# Patient Record
Sex: FEMALE | Race: WHITE | NOT HISPANIC OR LATINO | Employment: FULL TIME | URBAN - NONMETROPOLITAN AREA
[De-identification: names, ages, dates, MRNs, and addresses within clinical notes are randomized per-mention and may not be internally consistent; named-entity substitution may affect disease eponyms.]

---

## 2023-05-22 DIAGNOSIS — J01.90 ACUTE NON-RECURRENT SINUSITIS, UNSPECIFIED LOCATION: Primary | ICD-10-CM

## 2023-05-22 RX ORDER — AMOXICILLIN AND CLAVULANATE POTASSIUM 875; 125 MG/1; MG/1
875 TABLET, FILM COATED ORAL 2 TIMES DAILY
Qty: 20 TABLET | Refills: 0 | Status: SHIPPED | OUTPATIENT
Start: 2023-05-22 | End: 2023-06-01

## 2023-12-04 ENCOUNTER — OFFICE VISIT (OUTPATIENT)
Dept: PRIMARY CARE | Facility: CLINIC | Age: 48
End: 2023-12-04
Payer: COMMERCIAL

## 2023-12-04 ENCOUNTER — LAB (OUTPATIENT)
Dept: LAB | Facility: LAB | Age: 48
End: 2023-12-04
Payer: COMMERCIAL

## 2023-12-04 VITALS
WEIGHT: 176.6 LBS | HEIGHT: 66 IN | TEMPERATURE: 97 F | SYSTOLIC BLOOD PRESSURE: 135 MMHG | OXYGEN SATURATION: 99 % | DIASTOLIC BLOOD PRESSURE: 96 MMHG | HEART RATE: 74 BPM | BODY MASS INDEX: 28.38 KG/M2

## 2023-12-04 DIAGNOSIS — E55.9 VITAMIN D INSUFFICIENCY: ICD-10-CM

## 2023-12-04 DIAGNOSIS — N95.1 PERIMENOPAUSE: ICD-10-CM

## 2023-12-04 DIAGNOSIS — E78.5 BORDERLINE HYPERLIPIDEMIA: Primary | ICD-10-CM

## 2023-12-04 DIAGNOSIS — E78.5 BORDERLINE HYPERLIPIDEMIA: ICD-10-CM

## 2023-12-04 LAB
25(OH)D3 SERPL-MCNC: 53 NG/ML (ref 30–100)
ALBUMIN SERPL BCP-MCNC: 4.5 G/DL (ref 3.4–5)
ALP SERPL-CCNC: 54 U/L (ref 33–110)
ALT SERPL W P-5'-P-CCNC: 12 U/L (ref 7–45)
ANION GAP SERPL CALC-SCNC: 12 MMOL/L (ref 10–20)
AST SERPL W P-5'-P-CCNC: 17 U/L (ref 9–39)
BILIRUB SERPL-MCNC: 0.5 MG/DL (ref 0–1.2)
BUN SERPL-MCNC: 10 MG/DL (ref 6–23)
CALCIUM SERPL-MCNC: 9.5 MG/DL (ref 8.6–10.3)
CHLORIDE SERPL-SCNC: 104 MMOL/L (ref 98–107)
CHOLEST SERPL-MCNC: 167 MG/DL (ref 0–199)
CHOLESTEROL/HDL RATIO: 2.4
CO2 SERPL-SCNC: 27 MMOL/L (ref 21–32)
CREAT SERPL-MCNC: 0.78 MG/DL (ref 0.5–1.05)
ERYTHROCYTE [DISTWIDTH] IN BLOOD BY AUTOMATED COUNT: 14.4 % (ref 11.5–14.5)
FSH SERPL-ACNC: 96.2 IU/L
GFR SERPL CREATININE-BSD FRML MDRD: >90 ML/MIN/1.73M*2
GLUCOSE SERPL-MCNC: 86 MG/DL (ref 74–99)
HCT VFR BLD AUTO: 34.9 % (ref 36–46)
HDLC SERPL-MCNC: 71 MG/DL
HGB BLD-MCNC: 10.5 G/DL (ref 12–16)
LDLC SERPL CALC-MCNC: 82 MG/DL
LH SERPL-ACNC: 37.5 IU/L
MCH RBC QN AUTO: 26.4 PG (ref 26–34)
MCHC RBC AUTO-ENTMCNC: 30.1 G/DL (ref 32–36)
MCV RBC AUTO: 88 FL (ref 80–100)
NON HDL CHOLESTEROL: 96 MG/DL (ref 0–149)
NRBC BLD-RTO: 0 /100 WBCS (ref 0–0)
PLATELET # BLD AUTO: 367 X10*3/UL (ref 150–450)
POTASSIUM SERPL-SCNC: 4.1 MMOL/L (ref 3.5–5.3)
PROT SERPL-MCNC: 7.3 G/DL (ref 6.4–8.2)
RBC # BLD AUTO: 3.98 X10*6/UL (ref 4–5.2)
SODIUM SERPL-SCNC: 139 MMOL/L (ref 136–145)
TRIGL SERPL-MCNC: 70 MG/DL (ref 0–149)
TSH SERPL-ACNC: 0.85 MIU/L (ref 0.44–3.98)
VLDL: 14 MG/DL (ref 0–40)
WBC # BLD AUTO: 6.8 X10*3/UL (ref 4.4–11.3)

## 2023-12-04 PROCEDURE — 83002 ASSAY OF GONADOTROPIN (LH): CPT

## 2023-12-04 PROCEDURE — 83001 ASSAY OF GONADOTROPIN (FSH): CPT

## 2023-12-04 PROCEDURE — 1036F TOBACCO NON-USER: CPT | Performed by: PHYSICIAN ASSISTANT

## 2023-12-04 PROCEDURE — 36415 COLL VENOUS BLD VENIPUNCTURE: CPT

## 2023-12-04 PROCEDURE — 82306 VITAMIN D 25 HYDROXY: CPT

## 2023-12-04 PROCEDURE — 99214 OFFICE O/P EST MOD 30 MIN: CPT | Performed by: PHYSICIAN ASSISTANT

## 2023-12-04 NOTE — PROGRESS NOTES
"Subjective     HPI   Hayley Brumfield is a 48 y.o. year old female patient with presenting to clinic with concern for   Chief Complaint   Patient presents with    Annual Exam    New Patient Visit       Left shoulder clicking and discomfort.  Bilat thumb pain with ROM R>L     Weight concerns- exercising regularly- running heavily in the morning, cutting carbs nearly entirely to prevent weight.    Need to recheck BP- first visit here. Going to GYN tomorrow. Will recheck    There is no problem list on file for this patient.      Past Medical History:   Diagnosis Date    Encounter for full-term uncomplicated delivery      (spontaneous vaginal delivery)    Varicella without complication     Varicella without complication      Past Surgical History:   Procedure Laterality Date    BI STEREOTACTIC GUIDED BREAST RIGHT LOCALIZATION AND BIOPSY Right 2015    BI STEREOTACTIC GUIDED BREAST LOCALIZATION AND BIOPSY RIGHT [facility]    MOUTH SURGERY  2018    Oral Surgery Tooth Extraction      No family history on file.   Social History     Tobacco Use    Smoking status: Former     Types: Cigarettes    Smokeless tobacco: Never   Substance Use Topics    Alcohol use: Not Currently      No current outpatient medications on file.     Review of Systems  Constitutional: Denies fever  HEENT: Denies ST, earache  CVS: Denies Chest pain  Pulmonary: Denies wheezing, SOB  GI: Denies N/V  : Denies dysuria  Musculoskeletal:  Denies myalgia  Neuro: Denies focal weakness or numbness.  Skin: Denies Rashes.  *Review of Systems is negative unless otherwise mentioned in HPI or ROS above.    Objective   BP (!) 135/96   Pulse 74   Temp 36.1 °C (97 °F)   Ht 1.676 m (5' 6\")   Wt 80.1 kg (176 lb 9.6 oz)   SpO2 99%   BMI 28.50 kg/m²  reviewed Body mass index is 28.5 kg/m².     Physical Exam  Constitutional: NAD.  Resting comfortably.  Head: Atraumatic, normocephalic.  ENT: Moist oral mucosa. Nasal mucosa wnl.   Cardiac: Regular rate & " rhythm.   Pulmonary: Lungs clear bilat  GI: Soft, Nontender, nondistended.   Musculoskeletal: No peripheral edema.   Skin: No evidence of trauma. No rashes  Psych: Intact judgement and insight.    .Assessment/Plan

## 2023-12-05 ENCOUNTER — OFFICE VISIT (OUTPATIENT)
Dept: OBSTETRICS AND GYNECOLOGY | Facility: CLINIC | Age: 48
End: 2023-12-05
Payer: COMMERCIAL

## 2023-12-05 VITALS
WEIGHT: 176 LBS | HEIGHT: 66 IN | BODY MASS INDEX: 28.28 KG/M2 | SYSTOLIC BLOOD PRESSURE: 104 MMHG | DIASTOLIC BLOOD PRESSURE: 60 MMHG

## 2023-12-05 DIAGNOSIS — Z12.39 BREAST CANCER SCREENING, HIGH RISK PATIENT: ICD-10-CM

## 2023-12-05 DIAGNOSIS — Z12.4 CERVICAL CANCER SCREENING: ICD-10-CM

## 2023-12-05 DIAGNOSIS — N92.0 MENORRHAGIA WITH REGULAR CYCLE: ICD-10-CM

## 2023-12-05 DIAGNOSIS — Z01.419 WELL WOMAN EXAM WITH ROUTINE GYNECOLOGICAL EXAM: Primary | ICD-10-CM

## 2023-12-05 DIAGNOSIS — D64.9 ANEMIA, UNSPECIFIED TYPE: Primary | ICD-10-CM

## 2023-12-05 PROBLEM — F10.20 ALCOHOL DEPENDENCE (MULTI): Status: ACTIVE | Noted: 2023-12-05

## 2023-12-05 PROBLEM — R21 SKIN RASH: Status: ACTIVE | Noted: 2023-12-05

## 2023-12-05 PROBLEM — E66.3 OVERWEIGHT: Status: ACTIVE | Noted: 2023-12-05
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
## 2023-12-05 PROBLEM — N94.3 PMS (PREMENSTRUAL SYNDROME): Status: ACTIVE | Noted: 2023-12-05

## 2023-12-05 PROBLEM — K62.5 BLEEDING PER RECTUM: Status: ACTIVE | Noted: 2023-12-05

## 2023-12-05 PROBLEM — F17.200 SMOKER: Status: ACTIVE | Noted: 2023-12-05

## 2023-12-05 PROBLEM — K76.89 LIVER CYST: Status: ACTIVE | Noted: 2023-12-05

## 2023-12-05 PROBLEM — L50.9 URTICARIA: Status: ACTIVE | Noted: 2023-12-05

## 2023-12-05 PROBLEM — N63.0 BREAST MASS IN FEMALE: Status: ACTIVE | Noted: 2023-12-05

## 2023-12-05 PROBLEM — R92.8 ABNORMAL MRI, BREAST: Status: RESOLVED | Noted: 2023-12-05 | Resolved: 2023-12-05

## 2023-12-05 PROCEDURE — 87624 HPV HI-RISK TYP POOLED RSLT: CPT

## 2023-12-05 PROCEDURE — 99396 PREV VISIT EST AGE 40-64: CPT | Performed by: NURSE PRACTITIONER

## 2023-12-05 PROCEDURE — 1036F TOBACCO NON-USER: CPT | Performed by: NURSE PRACTITIONER

## 2023-12-05 PROCEDURE — 88142 CYTOPATH C/V THIN LAYER: CPT

## 2023-12-05 RX ORDER — FERROUS GLUCONATE 256(28)MG
56 TABLET ORAL DAILY
Qty: 180 TABLET | Refills: 1 | Status: SHIPPED | OUTPATIENT
Start: 2023-12-05 | End: 2024-05-31

## 2023-12-05 RX ORDER — MULTIVITAMIN WITH IRON
1 TABLET ORAL DAILY
COMMUNITY
Start: 2019-08-21

## 2023-12-05 ASSESSMENT — ENCOUNTER SYMPTOMS
ALLERGIC/IMMUNOLOGIC NEGATIVE: 1
MUSCULOSKELETAL NEGATIVE: 1
HEMATOLOGIC/LYMPHATIC NEGATIVE: 1
GASTROINTESTINAL NEGATIVE: 1
ENDOCRINE NEGATIVE: 1
NEUROLOGICAL NEGATIVE: 1
CONSTITUTIONAL NEGATIVE: 1
RESPIRATORY NEGATIVE: 1
PSYCHIATRIC NEGATIVE: 1
EYES NEGATIVE: 1
CARDIOVASCULAR NEGATIVE: 1

## 2023-12-05 NOTE — PROGRESS NOTES
"Chief Complaint    Annual Exam        HPI    ANNUAL - SAW PCP THEY DID HORMONE LEVELS WOULD LIKE TO TALK ABOUT THEM WITH YOU  Last edited by Shannen Woo MA on 2023  9:06 AM.        48 y.o.  female presents for annual well woman exam.   Patient's menstrual cycles are regular every 28 days, lasting 4 days. Denies abnormal bleeding in between periods. States periods are changing, heavier flow. Changes protection every 1-2 hours at heaviest. Her PCP ordered labs: CBC shows anemia. Normal TSH. Random FSH level was high but clinically insignificant in patient who is getting regular menstrual cycles. Denies vasomotor symptoms of menopause.   She is sexually active with . Denies dyspareunia.   She uses natural family planning for contraception.  She denies any breast concerns. Hx of benign breast biopsy in the past. She does alternate yearly mammograms and breast MRIs due to increase risk of breast cancer.   Pap hx. normal. Last pap: 2020 and was ASCUS, negative HPV.  Denies pelvic pain.  Denies abnormal vaginal symptoms.  Denies urinary or bowel concerns. Colonoscopy: 2021 which was normal and is due to return at 10 years.  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 Daughter is 12.     /60   Ht 1.676 m (5' 6\")   Wt 79.8 kg (176 lb)   LMP 2023 (Exact Date)   BMI 28.41 kg/m²      Review of Systems   Constitutional: Negative.    HENT: Negative.     Eyes: Negative.    Respiratory: Negative.     Cardiovascular: Negative.    Gastrointestinal: Negative.    Endocrine: Negative.    Genitourinary: Negative.    Musculoskeletal: Negative.    Skin: Negative.    Allergic/Immunologic: Negative.    Neurological: Negative.    Hematological: Negative.    Psychiatric/Behavioral: Negative.     All other systems reviewed and are negative.       Physical Exam  Constitutional:       Appearance: " Normal appearance.   HENT:      Head: Normocephalic.      Nose: Nose normal.   Cardiovascular:      Rate and Rhythm: Normal rate and regular rhythm.   Pulmonary:      Effort: Pulmonary effort is normal.      Breath sounds: Normal breath sounds.   Chest:   Breasts:     Right: Normal.      Left: Normal.   Abdominal:      General: Abdomen is flat.      Palpations: Abdomen is soft.   Genitourinary:     General: Normal vulva.      Vagina: Normal.      Cervix: Normal.      Uterus: Normal.       Adnexa: Right adnexa normal and left adnexa normal.      Rectum: Normal.      Comments: Pap collected today  Musculoskeletal:         General: Normal range of motion.      Cervical back: Normal range of motion and neck supple.   Skin:     General: Skin is warm and dry.   Neurological:      Mental Status: She is alert.   Psychiatric:         Mood and Affect: Mood normal.         Behavior: Behavior normal.     1. Well woman exam with routine gynecological exam  -Pap test w/HPV testing collected today.   -Self breast awareness exams reviewed.  -Advised yearly well woman exams.   -Follow up sooner if needed.     2. Cervical cancer screening  - THINPREP PAP    3. Menorrhagia with regular cycle  -Ordered: US PELVIS TRANSABDOMINAL WITH TRANSVAGINAL; Future  -Reviewed options for managing menorrhagia or heavy menstrual cycles, including non-hormonal vs. hormonal vs. surgical management.  -Non-hormonal options include Ibuprofen or another medication called tranexamic acid which can be taken during menses to lighten flow.  -Hormonal options include birth control pills, ring, Depo-Povera, Nexplanon, or progesterone IUD. Mirena IUD is a t-shaped device with progesterone hormone that can lighten cycles by thinning the lining of the uterus.   -Endometrial ablation is a surgical option. This outpatient procedure ablates or heats up the lining of the uterus, making periods lighter or nonexistent. Before endometrial ablation, evaluation of the  uterus is done with a pelvic ultrasound. An endometrial biopsy is also completed, which samples the lining of the uterus to assure that it is normal before an ablation.   -Patient planning to consider her options.   -Will notify patient with results of pelvic ultrasound when results available.     4. Breast cancer screening, high risk patient  -Alternating mammograms/MRIs.   -Ordered: MR breast bilateral w contrast full protocol; Future  -Ordered: BI mammo bilateral screening tomosynthesis; Future

## 2023-12-16 ENCOUNTER — HOSPITAL ENCOUNTER (OUTPATIENT)
Dept: RADIOLOGY | Facility: HOSPITAL | Age: 48
Discharge: HOME | End: 2023-12-16
Payer: COMMERCIAL

## 2023-12-16 DIAGNOSIS — N92.0 MENORRHAGIA WITH REGULAR CYCLE: ICD-10-CM

## 2023-12-16 PROCEDURE — 76830 TRANSVAGINAL US NON-OB: CPT

## 2023-12-16 PROCEDURE — 76856 US EXAM PELVIC COMPLETE: CPT | Performed by: RADIOLOGY

## 2023-12-16 PROCEDURE — 76830 TRANSVAGINAL US NON-OB: CPT | Performed by: RADIOLOGY

## 2023-12-26 LAB
CYTOLOGY CMNT CVX/VAG CYTO-IMP: NORMAL
HPV HR 12 DNA GENITAL QL NAA+PROBE: NEGATIVE
HPV HR GENOTYPES PNL CVX NAA+PROBE: NEGATIVE
HPV16 DNA SPEC QL NAA+PROBE: NEGATIVE
HPV18 DNA SPEC QL NAA+PROBE: NEGATIVE
LAB AP HPV GENOTYPE QUESTION: YES
LAB AP HPV HR: NORMAL
LABORATORY COMMENT REPORT: NORMAL
LABORATORY COMMENT REPORT: NORMAL
LMP START DATE: NORMAL
PATH REPORT.TOTAL CANCER: NORMAL

## 2024-01-19 ENCOUNTER — HOSPITAL ENCOUNTER (OUTPATIENT)
Dept: RADIOLOGY | Facility: HOSPITAL | Age: 49
Discharge: HOME | End: 2024-01-19
Payer: COMMERCIAL

## 2024-01-19 DIAGNOSIS — Z12.39 BREAST CANCER SCREENING, HIGH RISK PATIENT: ICD-10-CM

## 2024-01-19 PROCEDURE — 77049 MRI BREAST C-+ W/CAD BI: CPT | Performed by: STUDENT IN AN ORGANIZED HEALTH CARE EDUCATION/TRAINING PROGRAM

## 2024-01-19 PROCEDURE — 77049 MRI BREAST C-+ W/CAD BI: CPT

## 2024-01-19 PROCEDURE — A9575 INJ GADOTERATE MEGLUMI 0.1ML: HCPCS | Performed by: NURSE PRACTITIONER

## 2024-01-19 PROCEDURE — 2500000004 HC RX 250 GENERAL PHARMACY W/ HCPCS (ALT 636 FOR OP/ED): Performed by: NURSE PRACTITIONER

## 2024-01-19 RX ORDER — GADOTERATE MEGLUMINE 376.9 MG/ML
14 INJECTION INTRAVENOUS
Status: COMPLETED | OUTPATIENT
Start: 2024-01-19 | End: 2024-01-19

## 2024-01-19 RX ADMIN — GADOTERATE MEGLUMINE 14 ML: 376.9 INJECTION INTRAVENOUS at 13:20

## 2024-05-31 DIAGNOSIS — D64.9 ANEMIA, UNSPECIFIED TYPE: ICD-10-CM

## 2024-05-31 RX ORDER — FERROUS GLUCONATE 324(37.5)
76 TABLET ORAL
Qty: 180 TABLET | Refills: 1 | Status: SHIPPED | OUTPATIENT
Start: 2024-05-31 | End: 2024-11-27

## 2024-06-14 ENCOUNTER — APPOINTMENT (OUTPATIENT)
Dept: PRIMARY CARE | Facility: CLINIC | Age: 49
End: 2024-06-14
Payer: COMMERCIAL

## 2024-07-04 ENCOUNTER — LAB REQUISITION (OUTPATIENT)
Dept: LAB | Facility: HOSPITAL | Age: 49
End: 2024-07-04
Payer: COMMERCIAL

## 2024-07-04 DIAGNOSIS — N39.0 URINARY TRACT INFECTION, SITE NOT SPECIFIED: ICD-10-CM

## 2024-07-04 PROCEDURE — 87086 URINE CULTURE/COLONY COUNT: CPT

## 2024-07-05 ENCOUNTER — HOSPITAL ENCOUNTER (OUTPATIENT)
Dept: RADIOLOGY | Facility: HOSPITAL | Age: 49
Discharge: HOME | End: 2024-07-05
Payer: COMMERCIAL

## 2024-07-05 VITALS — WEIGHT: 175 LBS | HEIGHT: 66 IN | BODY MASS INDEX: 28.12 KG/M2

## 2024-07-05 DIAGNOSIS — Z12.39 BREAST CANCER SCREENING, HIGH RISK PATIENT: ICD-10-CM

## 2024-07-05 LAB — BACTERIA UR CULT: NORMAL

## 2024-07-05 PROCEDURE — 77067 SCR MAMMO BI INCL CAD: CPT

## 2024-12-10 NOTE — PROGRESS NOTES
Subjective     HPI   Hayley Brumfield is a 49 y.o. year old female patient with presenting to clinic with concern for   Chief Complaint   Patient presents with    Annual Exam       Annual wellness exam    BP Readings from Last 5 Encounters:   24 128/80   24 130/90   23 113/75   23 104/60   23 (!) 135/96     Wt Readings from Last 3 Encounters:   24 84.4 kg (186 lb)   24 79.4 kg (175 lb)   24 79.5 kg (175 lb 4.3 oz)     Weight concerns- exercising regularly- running heavily in the morning, cutting carbs nearly entirely to prevent weight.    Pt states that she stopped drinking alcohol entirely since last year.  Alcohol use disorder unclear how long, noted since  in EMR  Colonoscopy  Was seen for rectal bleeding  Dr Cooper    Nicotine dependence, smoker since ~  Quit smoking per pt.  Mammogram    High risk of breast cancer- mother hx breast CA, pt has had breast biopsies    Thyroid nodule, nontoxic single nodule   Saw [facility] endocrinology    1. Hypervascular solid nodule within the upper pole of the  right thyroid lobe as above.    2. Note made of borderline enlarged left submandibular lymph node, area of  palpable lump       Cold urticaria  Developed after she had covid last year    Patient Active Problem List   Diagnosis    Alcohol dependence    Bleeding per rectum    Breast mass in female    Liver cyst    Nontoxic single thyroid nodule    Overweight    PMS (premenstrual syndrome)    Skin rash    Smoker    Tick bite, infected, initial encounter    Urticaria       Past Medical History:   Diagnosis Date    Abnormal MRI, breast 2023    Encounter for full-term uncomplicated delivery      (spontaneous vaginal delivery)    Varicella without complication     Varicella without complication      Past Surgical History:   Procedure Laterality Date    BI STEREOTACTIC GUIDED BREAST RIGHT LOCALIZATION AND BIOPSY Right 2015    BI STEREOTACTIC GUIDED BREAST  "LOCALIZATION AND BIOPSY RIGHT [facility]    BREAST BIOPSY Right 2015    benign    MOUTH SURGERY  09/11/2018    Oral Surgery Tooth Extraction      Family History   Problem Relation Name Age of Onset    Diabetes Father      Breast cancer Paternal Grandmother  60    Lung cancer Paternal Grandfather        Social History     Tobacco Use    Smoking status: Every Day     Current packs/day: 1.00     Average packs/day: 1 pack/day for 32.9 years (32.9 ttl pk-yrs)     Types: Cigarettes     Start date: 1992    Smokeless tobacco: Never   Substance Use Topics    Alcohol use: Yes        Current Outpatient Medications:     B complex-vitamin C-folic acid (Dialyvite) 100-1 mg tablet, Take 1 tablet by mouth once daily., Disp: , Rfl:     multivitamin (Multiple Vitamins) tablet, Take 1 tablet by mouth once daily., Disp: , Rfl:     tirzepatide, weight loss, (Zepbound) 2.5 mg/0.5 mL injection, Inject 2.5 mg under the skin every 7 days., Disp: 4 each, Rfl: 0     Review of Systems  Constitutional: Denies fever  HEENT: Denies ST, earache  CVS: Denies Chest pain  Pulmonary: Denies wheezing, SOB  GI: Denies N/V  : Denies dysuria  Musculoskeletal:  Denies myalgia  Neuro: Denies focal weakness or numbness.  Skin: Denies Rashes.  *Review of Systems is negative unless otherwise mentioned in HPI or ROS above.    Objective   /80   Pulse 64   Temp 36.3 °C (97.3 °F)   Ht 1.676 m (5' 6\")   Wt 84.4 kg (186 lb)   SpO2 99%   BMI 30.02 kg/m²  reviewed Body mass index is 30.02 kg/m².     Physical Exam  Constitutional: NAD.  Resting comfortably.  Head: Atraumatic, normocephalic.  ENT: Moist oral mucosa. Nasal mucosa wnl.   Cardiac: Regular rate & rhythm.   Pulmonary: Lungs clear bilat  GI: Soft, Nontender, nondistended.   Musculoskeletal: No peripheral edema.   Skin: No evidence of trauma. No rashes  Psych: Intact judgement and insight.    .Assessment/Plan   Problem List Items Addressed This Visit    None  Visit Diagnoses         " Codes    Routine general medical examination at a health care facility    -  Primary Z00.00    Obesity (BMI 30.0-34.9)     E66.811    Relevant Medications    tirzepatide, weight loss, (Zepbound) 2.5 mg/0.5 mL injection    Thyroid nodule     E04.1    Relevant Orders    US thyroid    Borderline hyperlipidemia     E78.5    Relevant Orders    CBC    Comprehensive Metabolic Panel    TSH with reflex to Free T4 if abnormal    Lipid Panel    Vitamin D insufficiency     E55.9    Relevant Orders    Vitamin D 25-Hydroxy,Total (for eval of Vitamin D levels)

## 2024-12-11 ENCOUNTER — LAB (OUTPATIENT)
Dept: LAB | Facility: LAB | Age: 49
End: 2024-12-11
Payer: COMMERCIAL

## 2024-12-11 ENCOUNTER — APPOINTMENT (OUTPATIENT)
Dept: PRIMARY CARE | Facility: CLINIC | Age: 49
End: 2024-12-11
Payer: COMMERCIAL

## 2024-12-11 VITALS
HEART RATE: 64 BPM | OXYGEN SATURATION: 99 % | HEIGHT: 66 IN | SYSTOLIC BLOOD PRESSURE: 128 MMHG | BODY MASS INDEX: 29.89 KG/M2 | TEMPERATURE: 97.3 F | DIASTOLIC BLOOD PRESSURE: 80 MMHG | WEIGHT: 186 LBS

## 2024-12-11 DIAGNOSIS — E55.9 VITAMIN D INSUFFICIENCY: ICD-10-CM

## 2024-12-11 DIAGNOSIS — Z00.00 ROUTINE GENERAL MEDICAL EXAMINATION AT A HEALTH CARE FACILITY: Primary | ICD-10-CM

## 2024-12-11 DIAGNOSIS — D50.9 IRON DEFICIENCY ANEMIA, UNSPECIFIED IRON DEFICIENCY ANEMIA TYPE: ICD-10-CM

## 2024-12-11 DIAGNOSIS — E66.811 OBESITY (BMI 30.0-34.9): ICD-10-CM

## 2024-12-11 DIAGNOSIS — E04.1 THYROID NODULE: ICD-10-CM

## 2024-12-11 DIAGNOSIS — E78.5 BORDERLINE HYPERLIPIDEMIA: ICD-10-CM

## 2024-12-11 LAB
25(OH)D3 SERPL-MCNC: 33 NG/ML (ref 30–100)
ALBUMIN SERPL BCP-MCNC: 4.3 G/DL (ref 3.4–5)
ALP SERPL-CCNC: 51 U/L (ref 33–110)
ALT SERPL W P-5'-P-CCNC: 11 U/L (ref 7–45)
ANION GAP SERPL CALC-SCNC: 11 MMOL/L (ref 10–20)
AST SERPL W P-5'-P-CCNC: 16 U/L (ref 9–39)
BILIRUB SERPL-MCNC: 0.6 MG/DL (ref 0–1.2)
BUN SERPL-MCNC: 10 MG/DL (ref 6–23)
CALCIUM SERPL-MCNC: 9 MG/DL (ref 8.6–10.3)
CHLORIDE SERPL-SCNC: 105 MMOL/L (ref 98–107)
CHOLEST SERPL-MCNC: 166 MG/DL (ref 0–199)
CHOLESTEROL/HDL RATIO: 2.3
CO2 SERPL-SCNC: 25 MMOL/L (ref 21–32)
CREAT SERPL-MCNC: 0.8 MG/DL (ref 0.5–1.05)
EGFRCR SERPLBLD CKD-EPI 2021: >90 ML/MIN/1.73M*2
ERYTHROCYTE [DISTWIDTH] IN BLOOD BY AUTOMATED COUNT: 12.7 % (ref 11.5–14.5)
GLUCOSE SERPL-MCNC: 78 MG/DL (ref 74–99)
HCT VFR BLD AUTO: 35.6 % (ref 36–46)
HDLC SERPL-MCNC: 71.7 MG/DL
HGB BLD-MCNC: 11.8 G/DL (ref 12–16)
LDLC SERPL CALC-MCNC: 83 MG/DL
MCH RBC QN AUTO: 31.1 PG (ref 26–34)
MCHC RBC AUTO-ENTMCNC: 33.1 G/DL (ref 32–36)
MCV RBC AUTO: 94 FL (ref 80–100)
NON HDL CHOLESTEROL: 94 MG/DL (ref 0–149)
NRBC BLD-RTO: 0 /100 WBCS (ref 0–0)
PLATELET # BLD AUTO: 337 X10*3/UL (ref 150–450)
POTASSIUM SERPL-SCNC: 4 MMOL/L (ref 3.5–5.3)
PROT SERPL-MCNC: 6.8 G/DL (ref 6.4–8.2)
RBC # BLD AUTO: 3.8 X10*6/UL (ref 4–5.2)
SODIUM SERPL-SCNC: 137 MMOL/L (ref 136–145)
TRIGL SERPL-MCNC: 57 MG/DL (ref 0–149)
TSH SERPL-ACNC: 0.65 MIU/L (ref 0.44–3.98)
VLDL: 11 MG/DL (ref 0–40)
WBC # BLD AUTO: 6.4 X10*3/UL (ref 4.4–11.3)

## 2024-12-11 PROCEDURE — 82306 VITAMIN D 25 HYDROXY: CPT

## 2024-12-11 PROCEDURE — 3008F BODY MASS INDEX DOCD: CPT | Performed by: PHYSICIAN ASSISTANT

## 2024-12-11 PROCEDURE — 99396 PREV VISIT EST AGE 40-64: CPT | Performed by: PHYSICIAN ASSISTANT

## 2024-12-11 PROCEDURE — 36415 COLL VENOUS BLD VENIPUNCTURE: CPT

## 2024-12-11 ASSESSMENT — PATIENT HEALTH QUESTIONNAIRE - PHQ9
2. FEELING DOWN, DEPRESSED OR HOPELESS: NOT AT ALL
1. LITTLE INTEREST OR PLEASURE IN DOING THINGS: NOT AT ALL
SUM OF ALL RESPONSES TO PHQ9 QUESTIONS 1 AND 2: 0

## 2024-12-15 DIAGNOSIS — D50.9 IRON DEFICIENCY ANEMIA, UNSPECIFIED IRON DEFICIENCY ANEMIA TYPE: Primary | ICD-10-CM

## 2024-12-15 LAB
IRON SATN MFR SERPL: 19 % (ref 25–45)
IRON SERPL-MCNC: 71 UG/DL (ref 35–150)
TIBC SERPL-MCNC: 370 UG/DL (ref 240–445)
UIBC SERPL-MCNC: 299 UG/DL (ref 110–370)

## 2024-12-15 RX ORDER — FERROUS SULFATE 325(65) MG
1 TABLET, DELAYED RELEASE (ENTERIC COATED) ORAL
Qty: 90 TABLET | Refills: 3 | Status: SHIPPED | OUTPATIENT
Start: 2024-12-15 | End: 2025-12-15

## 2024-12-16 DIAGNOSIS — N95.1 PERIMENOPAUSE: ICD-10-CM

## 2024-12-16 DIAGNOSIS — F17.200 SMOKER: ICD-10-CM

## 2024-12-16 DIAGNOSIS — E66.3 OVERWEIGHT: Primary | ICD-10-CM

## 2024-12-16 RX ORDER — BUPROPION HYDROCHLORIDE 150 MG/1
150 TABLET ORAL EVERY MORNING
Qty: 90 TABLET | Refills: 0 | Status: SHIPPED | OUTPATIENT
Start: 2024-12-16

## 2024-12-21 ENCOUNTER — HOSPITAL ENCOUNTER (OUTPATIENT)
Dept: RADIOLOGY | Facility: HOSPITAL | Age: 49
Discharge: HOME | End: 2024-12-21
Payer: COMMERCIAL

## 2024-12-21 DIAGNOSIS — E04.1 THYROID NODULE: ICD-10-CM

## 2024-12-21 PROCEDURE — 76536 US EXAM OF HEAD AND NECK: CPT | Performed by: RADIOLOGY

## 2024-12-21 PROCEDURE — 76536 US EXAM OF HEAD AND NECK: CPT

## 2025-01-11 NOTE — PROGRESS NOTES
"HPI  Hayley Brumfield is a 49 y.o.  presents for AUB and annual GYN well woman exam.   No acute concerns.  No changes in personal or FH since last visit.   No concern for STIs. Uses pull out method for BC.    AUB:  Endorses heavy,painful menses every 21-28 days.   Reports weight gain.  Experiences mood swings.  Taking iron supplements.    TVUS 2023: Fibroid uterus. Endometrium 2.4 cm, correlates with last menstrual cycle. Normal adnexa.     Annual GYN exam:  - last pap, pap hx: 2023 neg/neg. No h/o abnormals per pt.   - last mammogram, breast hx: 2024 BI-RADS Category: 1 Negative. Has a history of benign breast biopsy. Lifetime risk of breast cancer Related at 21.2% with radiologist's recommendations \"given the patient's high risk for developing breast cancer, annual screening mammography and supplemental annual screening MRI examination of the breast, staggered at 6 month intervals, would be appropriate.\"   - breast abnormalities: negative for lumps, skin changes, nipple discharge, pain  - last colonoscopy: 2021 negative. Cleared x10 years.  - last DEXA scan: Never    OB hx:    GYN hx:   - menarche, menstrual pattern, LMP: Regular monthly menses  - Sexual activity/issues, STI protection/history, birth control:  Sexually active without issues. Uses pull out method for BC. No concern for STIs.  FH: PGM had breast CA. No GYN related cancers including breast, ovarian, endometrial, or colon cancer.     ROS notable for generally healthy, weight gain, ?hearing loss, vaginal dryness, urinary frequency, ?hot flashes.  10 point ROS negative except as listed above.     Physical exam  /70   Ht 1.676 m (5' 6\")   Wt 81.6 kg (180 lb)   LMP 2024   BMI 29.05 kg/m²      Physical Exam  Constitutional:       Appearance: Normal appearance.   HENT:      Head: Normocephalic and atraumatic.   Eyes:      Extraocular Movements: Extraocular movements intact.      Conjunctiva/sclera: Conjunctivae " "normal.      Pupils: Pupils are equal, round, and reactive to light.   Pulmonary:      Effort: Pulmonary effort is normal.   Chest:   Breasts:     Right: Normal.      Left: Normal.   Abdominal:      General: Abdomen is flat.      Palpations: Abdomen is soft.   Genitourinary:     General: Normal vulva.      Vagina: Normal.      Cervix: Normal.      Uterus: Normal.       Adnexa: Right adnexa normal and left adnexa normal.   Skin:     General: Skin is warm and dry.   Neurological:      General: No focal deficit present.      Mental Status: She is alert.   Psychiatric:         Mood and Affect: Mood normal.         Behavior: Behavior normal.         Thought Content: Thought content normal.         Judgment: Judgment normal.         Assessment/Plan  Annual well woman exam:  - last pap, pap hx: 12/05/2023 neg/neg. No h/o abnormals per pt.   - last mammogram, breast hx: 07/05/2024 BI-RADS Category: 1 Negative. Has a history of benign breast biopsy. Lifetime risk of breast cancer Related at 21.2% with radiologist's recommendations \"given the patient's high risk for developing breast cancer, annual screening mammography and supplemental annual screening MRI examination of the breast, staggered at 6 month intervals, would be appropriate.\" Referral sent to High Risk Breast Cancer.  - breast exam negative today. Discussed breast self awareness.  - last colonoscopy: 05/03/2021 negative. Cleared x10 years.  - last DEXA scan: Never    AUB:  - Labs including CBC, prolactin ordered  - Counseled about EMB. No unprotected sex for > 2 wks prior to procedure.  - RTC in 2 weeks for EMB.    Scribe Attestation  By signing my name below, I, Bridget Bang, Scribhi   attest that this documentation has been prepared under the direction and in the presence of Roque Vora MD.    "

## 2025-01-13 ENCOUNTER — APPOINTMENT (OUTPATIENT)
Dept: OBSTETRICS AND GYNECOLOGY | Facility: CLINIC | Age: 50
End: 2025-01-13
Payer: COMMERCIAL

## 2025-01-13 ENCOUNTER — LAB (OUTPATIENT)
Dept: LAB | Facility: LAB | Age: 50
End: 2025-01-13
Payer: COMMERCIAL

## 2025-01-13 VITALS
HEIGHT: 66 IN | DIASTOLIC BLOOD PRESSURE: 70 MMHG | BODY MASS INDEX: 28.93 KG/M2 | WEIGHT: 180 LBS | SYSTOLIC BLOOD PRESSURE: 110 MMHG

## 2025-01-13 DIAGNOSIS — Z80.3 FAMILY HISTORY OF BREAST CANCER: ICD-10-CM

## 2025-01-13 DIAGNOSIS — N93.9 ABNORMAL UTERINE BLEEDING (AUB): ICD-10-CM

## 2025-01-13 DIAGNOSIS — Z01.419 WELL WOMAN EXAM WITH ROUTINE GYNECOLOGICAL EXAM: Primary | ICD-10-CM

## 2025-01-13 LAB
ERYTHROCYTE [DISTWIDTH] IN BLOOD BY AUTOMATED COUNT: 12.7 % (ref 11.5–14.5)
HCT VFR BLD AUTO: 36.3 % (ref 36–46)
HGB BLD-MCNC: 11.8 G/DL (ref 12–16)
MCH RBC QN AUTO: 29.6 PG (ref 26–34)
MCHC RBC AUTO-ENTMCNC: 32.5 G/DL (ref 32–36)
MCV RBC AUTO: 91 FL (ref 80–100)
NRBC BLD-RTO: 0 /100 WBCS (ref 0–0)
PLATELET # BLD AUTO: 309 X10*3/UL (ref 150–450)
RBC # BLD AUTO: 3.99 X10*6/UL (ref 4–5.2)
WBC # BLD AUTO: 5.5 X10*3/UL (ref 4.4–11.3)

## 2025-01-13 PROCEDURE — 99213 OFFICE O/P EST LOW 20 MIN: CPT | Performed by: OBSTETRICS & GYNECOLOGY

## 2025-01-13 PROCEDURE — 85027 COMPLETE CBC AUTOMATED: CPT

## 2025-01-13 PROCEDURE — 3008F BODY MASS INDEX DOCD: CPT | Performed by: OBSTETRICS & GYNECOLOGY

## 2025-01-13 PROCEDURE — 84146 ASSAY OF PROLACTIN: CPT

## 2025-01-13 PROCEDURE — 99396 PREV VISIT EST AGE 40-64: CPT | Performed by: OBSTETRICS & GYNECOLOGY

## 2025-01-14 LAB — PROLACTIN SERPL-MCNC: 9.5 UG/L (ref 3–20)

## 2025-01-30 NOTE — PROGRESS NOTES
HPI  Hayley Brumfield is a 49 y.o.  presents for EMB for AUB.   No acute concerns.  No unprotected sex for > 2 wks prior to procedure.    Hx AUB:  Endorses heavy,painful menses every 21-28 days.   Reports weight gain.  Experiences mood swings.  Taking iron supplements.    TVUS 2023 for AUB  Uterus 8.8 x 4.7 x 5.7 cm. Fibroid uterus. Intramural versus possible submucosal fibroid within the leftward aspect of the uterus measuring 1.7 x 1.4 x 1.2 cm. And 1.1cm intramural fibroid.  Endometrium 2.4 cm, correlates with last menstrual cycle.   Right adnexa not visualized  Left adnexa normal.    OB hx:      ROS notable for AUB  10 point ROS negative except as listed above.     Physical exam  LMP 2024        Endometrial biopsy    Date/Time: 2025 11:44 AM    Performed by: Roque Vora MD  Authorized by: Roque Vora MD    Consent:     Consent obtained: verbal and written    Consent given by: patient    Patient agrees, verbalizes understanding, and wants to proceed: yes      Procedure explained and questions answered to patient or proxy's satisfaction: yes    Pre-procedure:     Urine pregnancy test: negative    Procedure:     A bimanual exam was performed: yes      Prepped with: Betadine    Tenaculum used: yes      A local block was performed: yes      Block method: intracervical      Local anesthetic: lidocaine 1% w/o epi    Amount used (mL): 1    Number of passes: 3  Findings:     Cervix: normal      Specimen collected: specimen collected and sent to pathology      Patient tolerance: tolerated well, no immediate complications      Assessment/Plan  HCG neg  EMB for AUB:  EMB completed.   Pt tolerated well  Discussed possible intramural fibroid and this will affect options for medical vs surigical management  Will discuss further once pathology results  RTC 2 wks    Scribe Attestation  By signing my name below, IBridget, Scribe   attest that this documentation has been prepared under the  direction and in the presence of Roque Vora MD.

## 2025-01-31 ENCOUNTER — APPOINTMENT (OUTPATIENT)
Dept: OBSTETRICS AND GYNECOLOGY | Facility: CLINIC | Age: 50
End: 2025-01-31
Payer: COMMERCIAL

## 2025-01-31 VITALS
HEIGHT: 66 IN | WEIGHT: 180 LBS | DIASTOLIC BLOOD PRESSURE: 60 MMHG | SYSTOLIC BLOOD PRESSURE: 120 MMHG | BODY MASS INDEX: 28.93 KG/M2

## 2025-01-31 DIAGNOSIS — N93.9 ABNORMAL UTERINE BLEEDING (AUB): Primary | ICD-10-CM

## 2025-01-31 DIAGNOSIS — Z32.02 PREGNANCY TEST NEGATIVE: ICD-10-CM

## 2025-01-31 LAB — PREGNANCY TEST URINE, POC: NEGATIVE

## 2025-01-31 PROCEDURE — 58100 BIOPSY OF UTERUS LINING: CPT | Performed by: OBSTETRICS & GYNECOLOGY

## 2025-01-31 PROCEDURE — 81025 URINE PREGNANCY TEST: CPT | Performed by: OBSTETRICS & GYNECOLOGY

## 2025-02-06 LAB
LABORATORY COMMENT REPORT: NORMAL
PATH REPORT.FINAL DX SPEC: NORMAL
PATH REPORT.GROSS SPEC: NORMAL
PATH REPORT.RELEVANT HX SPEC: NORMAL
PATH REPORT.TOTAL CANCER: NORMAL

## 2025-02-11 NOTE — PROGRESS NOTES
Hayley Brumfield female   1975 49 y.o.   [id number]      Chief Complaint    New Patient Visit          \[facility]\""  Hayley Brumfield is a 49 y.o.  female [facility]. 2015 right stereotactic core biopsy noted fibrocystic changes with duct ectasia, mammary fibrosis and benign microcalcifications. She has family history of breast cancer in paternal grandmother. She has 6 unaffected maternal aunts and 2 unaffected paternal aunts.    BREAST IMAGIN2024 bilateral screening mammogram, BI-RADS Category 1.  2024 full breast MRI, BI-RADS Category 2.    REPRODUCTIVE HISTORY: menarche age 10, , first birth age 36,  6mo, previous OCPs >20yr, perimenopausal, one biopsy non proliferative, no HRT, heterogeneously dense tissue    FAMILY CANCER HISTORY:   Paternal grandmother: breast cancer age 60  Paternal grandfather: lung cancer    REVIEW OF SYSTEMS    Constitutional:  Negative for appetite change, fatigue, fever and unexpected weight change.   HENT:  Negative for ear pain, hearing loss, nosebleeds, sore throat and trouble swallowing.    Eyes:  Negative for discharge, itching and visual disturbance.   Respiratory:  Negative for cough, chest tightness and shortness of breath.    Cardiovascular:  Negative for chest pain, palpitations and leg swelling.   Breast: as indicated in HPI  Gastrointestinal:  Negative for abdominal pain, constipation, diarrhea and nausea.   Endocrine: Negative for cold intolerance and heat intolerance.   Genitourinary:  Negative for dysuria, frequency, hematuria, pelvic pain and vaginal bleeding.   Musculoskeletal:  Negative for arthralgias, back pain, gait problem, joint swelling and myalgias.   Skin:  Negative for color change and rash.   Allergic/Immunologic: Negative for environmental allergies and food allergies.   Neurological:  Negative for dizziness, tremors, speech difficulty, weakness, numbness and  headaches.   Hematological:  Does not bruise/bleed easily.   Psychiatric/Behavioral:  Negative for agitation, dysphoric mood and sleep disturbance. The patient is not nervous/anxious.         MEDICATIONS  Current Outpatient Medications   Medication Instructions    B complex-vitamin C-folic acid (Dialyvite) 100-1 mg tablet 1 tablet, Daily    buPROPion XL (WELLBUTRIN XL) 150 mg, oral, Every morning, Do not crush, chew, or split.    ferrous sulfate 325 (65 Fe) MG EC tablet 1 tablet, oral, Daily with breakfast, Do not crush, chew, or split.        ALLERGIES  No Known Allergies     Patient Active Problem List    Diagnosis Date Noted    Abnormal uterine bleeding (AUB) 2025    Well woman exam with routine gynecological exam 2025    Alcohol dependence 2023    Bleeding per rectum 2023    Breast mass in female 2023    Liver cyst 2023    Overweight 2023    PMS (premenstrual syndrome) 2023    Skin rash 2023    Smoker 2023    Tick bite, infected, initial encounter 2023    Urticaria 2023    Nontoxic single thyroid nodule 2016     Past Medical History:   Diagnosis Date    Abnormal MRI, breast 2023    Encounter for full-term uncomplicated delivery      (spontaneous vaginal delivery)    Varicella without complication     Varicella without complication      Past Surgical History:   Procedure Laterality Date    BI STEREOTACTIC GUIDED BREAST RIGHT LOCALIZATION AND BIOPSY Right 2015    BI STEREOTACTIC GUIDED BREAST LOCALIZATION AND BIOPSY RIGHT [facility]    BREAST BIOPSY Right     benign    MOUTH SURGERY  2018    Oral Surgery Tooth Extraction      Family History   Problem Relation Name Age of Onset    Diabetes Father      Breast cancer Paternal Grandmother  60    Lung cancer Paternal Grandfather            SOCIAL HISTORY      Social History     Tobacco Use    Smoking status: Former     Current packs/day: 1.00     Average  packs/day: 1 pack/day for 33.2 years (33.2 ttl pk-yrs)     Types: Cigarettes     Start date: 1992     Passive exposure: Past    Smokeless tobacco: Never   Substance Use Topics    Alcohol use: Not Currently        VITALS  Vitals:    02/25/25 0812   BP: 146/87   Pulse: 73   Resp: 18   Temp: 36.6 °C (97.9 °F)   SpO2: 98%        PHYSICAL EXAM  Patient is alert and oriented x3, with appropriate mood. The gait is steady and hand grasps are equal. Sclera clear. The breasts are nearly symmetrical. The tissue is soft without palpable abnormalities, discrete nodules or masses. The skin and nipples appear normal. There is no cervical, supraclavicular, or axillary lymphadenopathy palpable. Heart rate and rhythm normal, S1 and S2 appreciated. The lungs are clear bilaterally. Abdomen is soft & non-tender.    Physical Exam     IMAGING    Time was spent viewing digital images of the radiology testing with the patient.     RISK PROFILE      ORDERS  Orders Placed This Encounter   Procedures    BI mammo bilateral screening tomosynthesis     Standing Status:   Future     Standing Expiration Date:   12/1/2027     Order Specific Question:   Perform a breast ultrasound if clinically indicated by Radiologist?     Answer:   Yes     Order Specific Question:   Previous Mamm performed at  location?     Answer:   Yes     Order Specific Question:   Reason for exam:     Answer:   high risk, angela patient     Order Specific Question:   Radiologist to Determine Optimal Study     Answer:   Yes     Order Specific Question:   Release result to [employer]     Answer:   Immediate     Order Specific Question:   Is this exam part of a Research Study? If Yes, link this order to the research study     Answer:   No     Order Specific Question:   Is the patient pregnant?     Answer:   No    MR breast bilateral w IV contrast fast screening self pay     Standing Status:   Future     Standing Expiration Date:   2/25/2026     Order Specific Question:   Reason for  exam:     Answer:   high risk surveillance care, dense breast tissue, lifetime risk > 20%     Order Specific Question:   Is the patient pregnant or breast feeding?     Answer:   No     Order Specific Question:   Does the patient have a Cochlear Implant, Brain Aneurysm Clip, Implanted Nerve or Bone Graft Stimulator, Implanted Breast Tissue Expander, Glucose Monitor or Neulasta Device?     Answer:   No     Order Specific Question:   Radiologist to Determine Optimal Study     Answer:   Yes     Order Specific Question:   [facility]     Answer:   Immediate     Order Specific Question:   Is this exam part of a Research Study? If Yes, link this order to the research study     Answer:   No          ASSESSMENT/PLAN  1. Encounter for screening mammogram for malignant neoplasm of breast  BI mammo bilateral screening tomosynthesis      2. Family history of breast cancer  Referral to High Risk Breast Cancer      3. Breast cancer screening, high risk patient  Clinic Appointment Request    MR breast bilateral w IV contrast fast screening self pay             Follow up in about 1 year (around 2/25/2026) for 1 year for clinic exam.  HIGH RISK PLAN  Yearly mammogram with digital breast tomosynthesis  Twice yearly clinical breast examinations  Breast MRI (to schedule call [phone])  Monthly self breast examinations &/or regular self breast awareness  Vitamin D3 2000 IU/daily (over the counter) unless your PCP recommends you take a specific dose  Exercise 3-4 times per week for 45-60 minutes  Limit alcohol to 3-4 drinks per week if you are menopausal  Eat healthy low-fat diet with lots of vegetable & fruits  Risk model indicate personal risk of breast cancer in the next 5 years and lifetime (age 85-90):  [place]: 5-year risk 3.1% (average 1.2%), lifetime risk 23.8%, (average 9.8%)    She is eligible for endocrine therapy with Tamoxifen, and also Raloxifene or Aromatase inhibitor if/when menopausal. Endocrine  therapy reduces lifetime risk of breast cancer by 50% when taken for 5 years. There is an alternative low dose Tamoxifen which can be taken for only 3 years.       Gricelda Funk, APRKENYONCleveland Clinic Fairview Hospital

## 2025-02-14 NOTE — PROGRESS NOTES
"HPI  Hayley Brumfield is a 49 y.o.  presents for follow up of her EMB results.   No acute concerns.  On iron supplements. Managed by her PCP.    EMB 2025 for AUB: Proliferative pattern endometrium     Labs including CBC and prolactin 2025: Normal with Hgb 11.8.  TSH 2024: normal    Hx 2025:  AUB:  Endorses heavy,painful menses every 21-28 days.   Reports weight gain.  Experiences mood swings.  Taking iron supplements.    TVUS 2023 for AUB  Uterus 8.8 x 4.7 x 5.7 cm. Fibroid uterus. Intramural versus possible submucosal fibroid within the leftward aspect of the uterus measuring 1.7 x 1.4 x 1.2 cm. And 1.1cm intramural fibroid.  Endometrium 2.4 cm, correlates with last menstrual cycle.   Right adnexa not visualized  Left adnexa normal.    OB hx:      ROS notable for AUB  10 point ROS negative except as listed above.     Physical exam  /70   Ht 1.676 m (5' 6\")   Wt 82.6 kg (182 lb)   BMI 29.38 kg/m²      Assessment/Plan  Follow up AUB  - reviewed AUB w/up: AUB-L  - TVUS: fibroid uterus, 1.1cm intramural fibroid  - negative labs   - thickened endometrium with EMB 2025 for AUB: benign. Proliferative pattern endometrium.  - Discussed bleeding precautions.  - Discussed medical vs surgical management options. No intervention at this time.     Scribe Attestation  By signing my name below, I, Yisel Baxter   attest that this documentation has been prepared under the direction and in the presence of Roque Vora MD.  "

## 2025-02-17 ENCOUNTER — APPOINTMENT (OUTPATIENT)
Dept: OBSTETRICS AND GYNECOLOGY | Facility: CLINIC | Age: 50
End: 2025-02-17
Payer: COMMERCIAL

## 2025-02-17 VITALS
BODY MASS INDEX: 29.25 KG/M2 | SYSTOLIC BLOOD PRESSURE: 120 MMHG | WEIGHT: 182 LBS | HEIGHT: 66 IN | DIASTOLIC BLOOD PRESSURE: 70 MMHG

## 2025-02-17 DIAGNOSIS — N93.9 ABNORMAL UTERINE BLEEDING (AUB): Primary | ICD-10-CM

## 2025-02-17 PROCEDURE — 99213 OFFICE O/P EST LOW 20 MIN: CPT | Performed by: OBSTETRICS & GYNECOLOGY

## 2025-02-17 PROCEDURE — 3008F BODY MASS INDEX DOCD: CPT | Performed by: OBSTETRICS & GYNECOLOGY

## 2025-02-25 ENCOUNTER — OFFICE VISIT (OUTPATIENT)
Dept: SURGICAL ONCOLOGY | Facility: CLINIC | Age: 50
End: 2025-02-25
Payer: COMMERCIAL

## 2025-02-25 VITALS
HEIGHT: 65 IN | HEART RATE: 73 BPM | DIASTOLIC BLOOD PRESSURE: 87 MMHG | TEMPERATURE: 97.9 F | OXYGEN SATURATION: 98 % | RESPIRATION RATE: 18 BRPM | SYSTOLIC BLOOD PRESSURE: 146 MMHG | WEIGHT: 182 LBS | BODY MASS INDEX: 30.32 KG/M2

## 2025-02-25 DIAGNOSIS — Z12.31 ENCOUNTER FOR SCREENING MAMMOGRAM FOR MALIGNANT NEOPLASM OF BREAST: Primary | ICD-10-CM

## 2025-02-25 DIAGNOSIS — Z12.39 BREAST CANCER SCREENING, HIGH RISK PATIENT: ICD-10-CM

## 2025-02-25 DIAGNOSIS — Z80.3 FAMILY HISTORY OF BREAST CANCER: ICD-10-CM

## 2025-02-25 PROCEDURE — 99214 OFFICE O/P EST MOD 30 MIN: CPT | Performed by: NURSE PRACTITIONER

## 2025-02-25 PROCEDURE — 99204 OFFICE O/P NEW MOD 45 MIN: CPT | Performed by: NURSE PRACTITIONER

## 2025-02-25 PROCEDURE — 3008F BODY MASS INDEX DOCD: CPT | Performed by: NURSE PRACTITIONER

## 2025-02-25 ASSESSMENT — ENCOUNTER SYMPTOMS
LOSS OF SENSATION IN FEET: 0
OCCASIONAL FEELINGS OF UNSTEADINESS: 0
DEPRESSION: 0

## 2025-02-25 ASSESSMENT — PAIN SCALES - GENERAL: PAINLEVEL_OUTOF10: 0-NO PAIN

## 2025-02-25 ASSESSMENT — COLUMBIA-SUICIDE SEVERITY RATING SCALE - C-SSRS
1. IN THE PAST MONTH, HAVE YOU WISHED YOU WERE DEAD OR WISHED YOU COULD GO TO SLEEP AND NOT WAKE UP?: NO
6. HAVE YOU EVER DONE ANYTHING, STARTED TO DO ANYTHING, OR PREPARED TO DO ANYTHING TO END YOUR LIFE?: NO
2. HAVE YOU ACTUALLY HAD ANY THOUGHTS OF KILLING YOURSELF?: NO

## 2025-03-07 DIAGNOSIS — F17.200 SMOKER: ICD-10-CM

## 2025-03-07 DIAGNOSIS — N95.1 PERIMENOPAUSE: ICD-10-CM

## 2025-03-07 DIAGNOSIS — E66.3 OVERWEIGHT: ICD-10-CM

## 2025-03-07 RX ORDER — BUPROPION HYDROCHLORIDE 150 MG/1
150 TABLET ORAL EVERY MORNING
Qty: 90 TABLET | Refills: 0 | Status: SHIPPED | OUTPATIENT
Start: 2025-03-07

## 2025-03-18 ENCOUNTER — APPOINTMENT (OUTPATIENT)
Dept: PRIMARY CARE | Facility: CLINIC | Age: 50
End: 2025-03-18
Payer: COMMERCIAL

## 2025-03-22 ENCOUNTER — HOSPITAL ENCOUNTER (OUTPATIENT)
Dept: RADIOLOGY | Facility: HOSPITAL | Age: 50
Discharge: HOME | End: 2025-03-22

## 2025-03-22 DIAGNOSIS — Z12.39 BREAST CANCER SCREENING, HIGH RISK PATIENT: ICD-10-CM

## 2025-03-22 PROCEDURE — 6100000003 BI MR BREAST BILATERAL WITH CONTRAST FAST SCREENING SELF PAY

## 2025-03-22 PROCEDURE — A9575 INJ GADOTERATE MEGLUMI 0.1ML: HCPCS | Performed by: NURSE PRACTITIONER

## 2025-03-22 PROCEDURE — 2550000001 HC RX 255 CONTRASTS: Performed by: NURSE PRACTITIONER

## 2025-03-22 RX ORDER — GADOTERATE MEGLUMINE 376.9 MG/ML
20 INJECTION INTRAVENOUS
Status: COMPLETED | OUTPATIENT
Start: 2025-03-22 | End: 2025-03-22

## 2025-03-22 RX ADMIN — GADOTERATE MEGLUMINE 17 ML: 376.9 INJECTION INTRAVENOUS at 09:30

## 2025-04-05 ENCOUNTER — OFFICE VISIT (OUTPATIENT)
Dept: URGENT CARE | Facility: URGENT CARE | Age: 50
End: 2025-04-05
Payer: COMMERCIAL

## 2025-04-05 VITALS
OXYGEN SATURATION: 97 % | SYSTOLIC BLOOD PRESSURE: 123 MMHG | RESPIRATION RATE: 16 BRPM | TEMPERATURE: 98.4 F | WEIGHT: 180.56 LBS | HEART RATE: 77 BPM | BODY MASS INDEX: 30.99 KG/M2 | DIASTOLIC BLOOD PRESSURE: 75 MMHG

## 2025-04-05 DIAGNOSIS — J02.9 ACUTE VIRAL PHARYNGITIS: ICD-10-CM

## 2025-04-05 DIAGNOSIS — J02.9 SORE THROAT: Primary | ICD-10-CM

## 2025-04-05 DIAGNOSIS — J01.00 ACUTE NON-RECURRENT MAXILLARY SINUSITIS: ICD-10-CM

## 2025-04-05 LAB
POC HUMAN RHINOVIRUS PCR: NEGATIVE
POC INFLUENZA A VIRUS PCR: NEGATIVE
POC INFLUENZA B VIRUS PCR: NEGATIVE
POC RESPIRATORY SYNCYTIAL VIRUS PCR: NEGATIVE
POC STREPTOCOCCUS PYOGENES (GROUP A STREP) PCR: NEGATIVE

## 2025-04-05 RX ORDER — AMOXICILLIN AND CLAVULANATE POTASSIUM 875; 125 MG/1; MG/1
875 TABLET, FILM COATED ORAL 2 TIMES DAILY
Qty: 14 TABLET | Refills: 0 | Status: SHIPPED | OUTPATIENT
Start: 2025-04-05 | End: 2025-04-12

## 2025-04-05 ASSESSMENT — ENCOUNTER SYMPTOMS
CONFUSION: 0
CHILLS: 0
SHORTNESS OF BREATH: 0
LIGHT-HEADEDNESS: 0
FATIGUE: 0
WHEEZING: 0
DIFFICULTY URINATING: 0
APPETITE CHANGE: 0
COUGH: 0
NECK STIFFNESS: 0
VOMITING: 0
NAUSEA: 0
ABDOMINAL PAIN: 0
RHINORRHEA: 1
COLOR CHANGE: 0
FEVER: 0
TROUBLE SWALLOWING: 1
DIARRHEA: 0
BACK PAIN: 0
DYSURIA: 0
DIZZINESS: 0
SORE THROAT: 1
MYALGIAS: 0
WOUND: 0
CHEST TIGHTNESS: 0
PALPITATIONS: 0
NECK PAIN: 0
SINUS PRESSURE: 1
DIAPHORESIS: 0
FREQUENCY: 0
HEADACHES: 0
SINUS PAIN: 1
ACTIVITY CHANGE: 0
WEAKNESS: 0

## 2025-04-05 NOTE — PROGRESS NOTES
Subjective   Patient ID: Hayley Brumfield is a 49 y.o. female. They present today with a chief complaint of No chief complaint on file..    History of Present Illness  Chief complaint: Nasal congestion with sore throat, facial pain pressure and puffiness.      HPI: Onset of above symptoms x 7 days.  No sick contacts.  Not vaccinated or boostered against influenza or COVID.  Patient does not smoke.  Denies chest pain shortness of breath nausea vomiting or diarrhea and reports normal intake and output.  Not utilizing anything over-the-counter for the symptoms.      Nurses notes, vitals and old chart reviewed      History provided by:  Patient   used: No        Past Medical History  Allergies as of 2025    (No Known Allergies)       (Not in a hospital admission)       Past Medical History:   Diagnosis Date    Abnormal MRI, breast 2023    Encounter for full-term uncomplicated delivery      (spontaneous vaginal delivery)    Varicella without complication     Varicella without complication       Past Surgical History:   Procedure Laterality Date    BI STEREOTACTIC GUIDED BREAST RIGHT LOCALIZATION AND BIOPSY Right 2015    BI STEREOTACTIC GUIDED BREAST LOCALIZATION AND BIOPSY RIGHT [facility]    BREAST BIOPSY Right     benign    MOUTH SURGERY  2018    Oral Surgery Tooth Extraction        reports that she has quit smoking. Her smoking use included cigarettes. She started smoking about 33 years ago. She has a 33.3 pack-year smoking history. She has been exposed to tobacco smoke. She has never used smokeless tobacco. She reports that she does not currently use alcohol. She reports that she does not use drugs.    Review of Systems  Review of Systems   Constitutional:  Negative for activity change, appetite change, chills, diaphoresis, fatigue and fever.   HENT:  Positive for congestion, rhinorrhea, sinus pressure, sinus pain, sore throat and trouble swallowing. Negative for  drooling, ear pain, nosebleeds and sneezing.    Eyes:  Negative for visual disturbance.   Respiratory:  Negative for cough, chest tightness, shortness of breath and wheezing.    Cardiovascular:  Negative for chest pain, palpitations and leg swelling.   Gastrointestinal:  Negative for abdominal pain, diarrhea, nausea and vomiting.   Genitourinary:  Negative for decreased urine volume, difficulty urinating, dysuria, frequency and urgency.   Musculoskeletal:  Negative for back pain, myalgias, neck pain and neck stiffness.   Skin:  Negative for color change and wound.   Neurological:  Negative for dizziness, syncope, weakness, light-headedness and headaches.   Psychiatric/Behavioral:  Negative for confusion.                                   Objective    There were no vitals filed for this visit.  No LMP recorded.    Physical Exam  Physical Exam  Constitutional:       General: Pateint is awake. Patient is not in acute distress.     Appearance: Normal appearance. Pateint is well-developed and well-groomed. Patient is not ill-appearing, toxic-appearing or diaphoretic.   HENT:      Head: Normocephalic and atraumatic. No right periorbital erythema or left periorbital erythema.      Jaw: There is normal jaw occlusion.      Right Ear: Hearing, tympanic membrane, ear canal and external ear normal. No tenderness. No mastoid tenderness.      Left Ear: Hearing, tympanic membrane, ear canal and external ear normal. No tenderness. No mastoid tenderness.      Nose: Severe congestion and rhinorrhea with beefy red turbinates are present.  Positive for maxillary sinus tenderness but negative for frontal, ethmoid tenderness, erythema or edema. Neg periorbital edema or erythema.      Comments:      Mouth/Throat:      Lips: Pink.      Mouth: Mucous membranes are moist. No angioedema. No sublingual or lingual edema.      Pharynx: Oropharynx is clear. Uvula midline without uvulitis. No erythema, loculated or confluent exudate. No  angioedema. No PTA formation. No petechial rash.  Positive for posterior nasal streaking.  Eyes:   PERRLA, EOMS intact     Conjunctiva/sclera: Conjunctivae normal.   Lymphatics: Neg for submandibular, submental. Neg ant or post cervical.   Cardiovascular:      Rate and Rhythm: Normal rate.  RRR.      Pulses: Normal pulses.      Heart sounds: Normal heart sounds. No MGR.  Pulmonary:      Effort: Pulmonary effort is normal.      Breath sounds: Normal breath sounds and air entry.   ABD:   ABD RSNT. Neg CVA tenderness. This is a grossly neg surg abd.   Musculoskeletal:   STEIN without deficits.      Right lower leg: No edema.      Left lower leg: No edema.   NECK: NEG JVD. Full ROM with flex/ext and lateral movements.   Skin:     Capillary Refill: Capillary refill takes less than 2 seconds.   Neurological:      Mental Status: Aox4, following all commands and answering all questions briskly and appropriately.      GCS: GCS eye subscore is 4. GCS verbal subscore is 5. GCS motor subscore is 6.   Psychiatric:         Mood and Affect: Mood normal.         Behavior: Behavior normal. Behavior is cooperative.         Thought Content: Thought content normal.         Judgment: Judgment normal.   Procedures    Point of Care Test & Imaging Results from this visit  No results found for this visit on 04/05/25.   Imaging  No results found.    Cardiology, Vascular, and Other Imaging  No other imaging results found for the past 2 days      Diagnostic study results (if any) were reviewed by POONAM Dunne.    Assessment/Plan   Allergies, medications, history, and pertinent labs/EKGs/Imaging reviewed by POONAM Dnune.     Medical Decision Making  HPI: SEE NARRATIVE  HISTORIAN: Pateint  INDEPENDENT HISTORIAN:   RECORDS REVIEWED:  DIFFERENTIAL DX: Strep pharyngitis versus viral pharyngitis.  Viral sinusitis versus bacterial sinusitis  LABS: Strep testing negative  XRAY:  EKG:  IN CLINIC MEDICATIONS: NONE  CONSULTED  WITH:  Dicussions: Discussed with patient starting antibiotic in 3 days on day 10 should her symptoms not be gone.  Discussed with her antibiotic stewardship.  She verbalizes understanding.  DIAGNOSIS: See urgent care course of treatment  SEE URGENT CARE COURSE OF TREATMENT AND DOCUMENTATION FOR RX MEDS GIVEN.   PROCEDURES: SEE PROCEDURE NOTE  Patient and or family were counselled on labs, radiological studies, and all testing applicable for this visit as well as diagnosis. Patient was discharged homewith stable afebrile non-toxic vital signs. They verbalized discharge instructions that were given to them BOTH written and verbally by myself.  They were given ample time to ask questionsand have none at time of disposition.    Orders and Diagnoses  There are no diagnoses linked to this encounter.    Medical Admin Record      Patient disposition: Home    Electronically signed by POONAM Dunne  9:35 AM

## 2025-04-05 NOTE — PATIENT INSTRUCTIONS
Increase fluids  Eat a well balanced diet   Tylenol or motrin for fevers  Rx medications or over the counter meds as discussed or ordered  Read and follow preprinted instructions  Flonase over-the-counter as discussed  As always you are invited to return if your condition should change or worsen in any way  If your condition worsens or becomes severe or life threatening, please go to the nearest emergency department  Follow up with your family dr in 3 days if no better.

## 2025-05-15 ENCOUNTER — APPOINTMENT (OUTPATIENT)
Dept: PRIMARY CARE | Facility: CLINIC | Age: 50
End: 2025-05-15
Payer: COMMERCIAL

## 2025-05-15 VITALS
BODY MASS INDEX: 29.02 KG/M2 | SYSTOLIC BLOOD PRESSURE: 122 MMHG | WEIGHT: 170 LBS | HEART RATE: 74 BPM | TEMPERATURE: 96.8 F | HEIGHT: 64 IN | OXYGEN SATURATION: 97 % | DIASTOLIC BLOOD PRESSURE: 78 MMHG

## 2025-05-15 DIAGNOSIS — E66.3 OVERWEIGHT: ICD-10-CM

## 2025-05-15 DIAGNOSIS — D50.9 IRON DEFICIENCY ANEMIA, UNSPECIFIED IRON DEFICIENCY ANEMIA TYPE: ICD-10-CM

## 2025-05-15 DIAGNOSIS — E61.1 IRON DEFICIENCY: Primary | ICD-10-CM

## 2025-05-15 DIAGNOSIS — N95.1 PERIMENOPAUSE: ICD-10-CM

## 2025-05-15 DIAGNOSIS — F17.200 SMOKER: ICD-10-CM

## 2025-05-15 PROCEDURE — 3008F BODY MASS INDEX DOCD: CPT | Performed by: PHYSICIAN ASSISTANT

## 2025-05-15 PROCEDURE — 99213 OFFICE O/P EST LOW 20 MIN: CPT | Performed by: PHYSICIAN ASSISTANT

## 2025-05-15 RX ORDER — BUPROPION HYDROCHLORIDE 150 MG/1
150 TABLET ORAL EVERY MORNING
Qty: 90 TABLET | Refills: 3 | Status: SHIPPED | OUTPATIENT
Start: 2025-05-15

## 2025-05-15 RX ORDER — FERROUS SULFATE 325(65) MG
1 TABLET, DELAYED RELEASE (ENTERIC COATED) ORAL
Qty: 90 TABLET | Refills: 3 | Status: SHIPPED | OUTPATIENT
Start: 2025-05-15 | End: 2026-05-15

## 2025-05-15 ASSESSMENT — PATIENT HEALTH QUESTIONNAIRE - PHQ9
SUM OF ALL RESPONSES TO PHQ9 QUESTIONS 1 AND 2: 0
1. LITTLE INTEREST OR PLEASURE IN DOING THINGS: NOT AT ALL
2. FEELING DOWN, DEPRESSED OR HOPELESS: NOT AT ALL

## 2025-05-15 NOTE — PROGRESS NOTES
Subjective     HPI   Hayley Brumfield is a 50 y.o. year old female patient with presenting to clinic with concern for   Chief Complaint   Patient presents with    Follow-up     3 MTH     Hayley feels good on wellbutrin and iron supplements. Exercising daily. Feeling better than she was this winter. Saw GYN who rechecked CBC. No significant difference than prior to iron supplements 1 month prior    BP Readings from Last 5 Encounters:   05/15/25 122/78   04/05/25 123/75   02/25/25 146/87   02/17/25 120/70   01/31/25 120/60     Weight concerns- exercising regularly- running heavily in the morning, cutting carbs nearly entirely to prevent weight.     Pt states that she stopped drinking alcohol entirely since last year.    Alcohol use disorder unclear how long, noted since 2018 in EMR    Colonoscopy 2021 Was seen for rectal bleeding  Dr Cooper     Nicotine dependence, smoker since ~1991  Quit smoking per pt.    Exercising daily and feeling well     High risk of breast cancer- mother hx breast CA, pt has had breast biopsies  High risk clinic, Gricelda Funk     Thyroid nodule, nontoxic single nodule   Saw [facility] endocrinology 2016   1. Hypervascular solid nodule within the upper pole of the  right thyroid lobe as above.    2. Note made of borderline enlarged left submandibular lymph node, area of  palpable lump     Problem List[1]    Medical History[2]   Surgical History[3]   Family History[4]   Social History     Tobacco Use    Smoking status: Former     Average packs/day: 1 pack/day for 26.0 years (26.0 ttl pk-yrs)     Types: Cigarettes     Start date: 1992     Passive exposure: Past    Smokeless tobacco: Never   Substance Use Topics    Alcohol use: Not Currently      Current Medications[5]     Review of Systems  Constitutional: Denies fever  HEENT: Denies ST, earache  CVS: Denies Chest pain  Pulmonary: Denies wheezing, SOB  GI: Denies N/V  : Denies dysuria  Musculoskeletal:  Denies myalgia  Neuro: Denies focal weakness or  "numbness.  Skin: Denies Rashes.  *Review of Systems is negative unless otherwise mentioned in HPI or ROS above.    Objective   /78   Pulse 74   Temp 36 °C (96.8 °F)   Ht 1.626 m (5' 4\")   Wt 77.1 kg (170 lb)   SpO2 97%   BMI 29.18 kg/m²  reviewed Body mass index is 29.18 kg/m².     Physical Exam  Constitutional: NAD.  Resting comfortably.  Head: Atraumatic, normocephalic.  ENT: Moist oral mucosa. Nasal mucosa wnl.   Cardiac: Regular rate & rhythm.   Pulmonary: Lungs clear bilat  GI: Soft, Nontender, nondistended.   Musculoskeletal: No peripheral edema.   Skin: No evidence of trauma. No rashes  Psych: Intact judgement and insight.    .Assessment/Plan   Problem List Items Addressed This Visit           ICD-10-CM    Overweight E66.3    Relevant Medications    buPROPion XL (Wellbutrin XL) 150 mg 24 hr tablet    Smoker F17.200    Relevant Medications    buPROPion XL (Wellbutrin XL) 150 mg 24 hr tablet     Other Visit Diagnoses         Codes      Iron deficiency    -  Primary E61.1    Relevant Orders    CBC    Iron and TIBC    Ferritin      Perimenopause     N95.1    Relevant Medications    buPROPion XL (Wellbutrin XL) 150 mg 24 hr tablet      Iron deficiency anemia, unspecified iron deficiency anemia type     D50.9    Relevant Medications    ferrous sulfate 325 (65 Fe) mg EC tablet                 [1]   Patient Active Problem List  Diagnosis    Alcohol dependence    Bleeding per rectum    Breast mass in female    Liver cyst    Nontoxic single thyroid nodule    Overweight    PMS (premenstrual syndrome)    Skin rash    Smoker    Tick bite, infected, initial encounter    Urticaria    Abnormal uterine bleeding (AUB)    Well woman exam with routine gynecological exam   [2]   Past Medical History:  Diagnosis Date    Abnormal MRI, breast 2023    Encounter for full-term uncomplicated delivery      (spontaneous vaginal delivery)    Varicella without complication     Varicella without complication   [3]   Past " Surgical History:  Procedure Laterality Date    BI STEREOTACTIC GUIDED BREAST RIGHT LOCALIZATION AND BIOPSY Right 09/02/2015    BI STEREOTACTIC GUIDED BREAST LOCALIZATION AND BIOPSY RIGHT [facility]    BREAST BIOPSY Right 2015    benign    MOUTH SURGERY  09/11/2018    Oral Surgery Tooth Extraction   [4]   Family History  Problem Relation Name Age of Onset    Suicidality Mother      Diabetes Father      Breast cancer Paternal Grandmother  60    Lung cancer Paternal Grandfather     [5]   Current Outpatient Medications:     buPROPion XL (Wellbutrin XL) 150 mg 24 hr tablet, TAKE 1 TABLET (150 MG) BY MOUTH ONCE DAILY IN THE MORNING. DO NOT CRUSH, CHEW, OR SPLIT., Disp: 90 tablet, Rfl: 0    ferrous sulfate 325 (65 Fe) MG EC tablet, Take 1 tablet by mouth once daily with breakfast. Do not crush, chew, or split., Disp: 90 tablet, Rfl: 3

## 2025-07-15 DIAGNOSIS — Z12.39 BREAST CANCER SCREENING, HIGH RISK PATIENT: ICD-10-CM

## 2025-07-15 DIAGNOSIS — M21.619 BUNION OF GREAT TOE: Primary | ICD-10-CM

## 2025-09-02 ENCOUNTER — APPOINTMENT (OUTPATIENT)
Dept: RADIOLOGY | Facility: HOSPITAL | Age: 50
End: 2025-09-02
Payer: COMMERCIAL

## 2025-09-06 ENCOUNTER — APPOINTMENT (OUTPATIENT)
Dept: RADIOLOGY | Facility: HOSPITAL | Age: 50
End: 2025-09-06
Payer: COMMERCIAL

## 2025-09-10 ENCOUNTER — APPOINTMENT (OUTPATIENT)
Dept: PODIATRY | Facility: CLINIC | Age: 50
End: 2025-09-10
Payer: COMMERCIAL

## 2025-09-16 ENCOUNTER — APPOINTMENT (OUTPATIENT)
Dept: RADIOLOGY | Facility: HOSPITAL | Age: 50
End: 2025-09-16
Payer: COMMERCIAL